# Patient Record
Sex: FEMALE | Race: ASIAN | URBAN - METROPOLITAN AREA
[De-identification: names, ages, dates, MRNs, and addresses within clinical notes are randomized per-mention and may not be internally consistent; named-entity substitution may affect disease eponyms.]

---

## 2017-08-27 ENCOUNTER — OFFICE VISIT (OUTPATIENT)
Dept: FAMILY MEDICINE CLINIC | Facility: CLINIC | Age: 22
End: 2017-08-27

## 2017-08-27 VITALS
HEART RATE: 84 BPM | BODY MASS INDEX: 20.99 KG/M2 | WEIGHT: 100 LBS | DIASTOLIC BLOOD PRESSURE: 64 MMHG | SYSTOLIC BLOOD PRESSURE: 102 MMHG | TEMPERATURE: 98 F | OXYGEN SATURATION: 99 % | HEIGHT: 58 IN

## 2017-08-27 DIAGNOSIS — J06.9 UPPER RESPIRATORY TRACT INFECTION, UNSPECIFIED TYPE: Primary | ICD-10-CM

## 2017-08-27 DIAGNOSIS — R05.9 COUGH: ICD-10-CM

## 2017-08-27 PROCEDURE — 99202 OFFICE O/P NEW SF 15 MIN: CPT | Performed by: PHYSICIAN ASSISTANT

## 2017-08-27 RX ORDER — CETIRIZINE HYDROCHLORIDE 10 MG/1
10 TABLET ORAL DAILY
COMMUNITY

## 2017-08-27 RX ORDER — BENZONATATE 100 MG/1
100 CAPSULE ORAL 3 TIMES DAILY PRN
Qty: 30 CAPSULE | Refills: 0 | Status: SHIPPED | OUTPATIENT
Start: 2017-08-27 | End: 2017-09-06

## 2017-08-27 NOTE — PROGRESS NOTES
CHIEF COMPLAINT:   Patient presents with:  Cough: cough is dry, runny nsoe , nose congestion, drainage x 1 wk      HPI:   Naomi Adams is a 25year old female who presents for upper respiratory symptoms for  7 days.  Patient reports congestion, dry NECK: Supple, non-tender  LUNGS: clear to auscultation bilaterally, no wheezes or rhonchi. Breathing is non labored. CARDIO: RRR without murmur  EXTREMITIES: no cyanosis, clubbing or edema  LYMPH:  no lymphadenopathy.         ASSESSMENT AND PLAN:   Alfredito Gaffney · Avoid being exposed to cigarette smoke (yours or others’). · You may use acetaminophen or ibuprofen to control pain and fever, unless another medicine was prescribed.  (Note: If you have chronic liver or kidney disease, have ever had a stomach ulcer or g The patient is asked to return if sx's persist or worsen.

## 2018-04-07 ENCOUNTER — OFFICE VISIT (OUTPATIENT)
Dept: FAMILY MEDICINE CLINIC | Facility: CLINIC | Age: 23
End: 2018-04-07

## 2018-04-07 VITALS
SYSTOLIC BLOOD PRESSURE: 82 MMHG | HEIGHT: 57 IN | OXYGEN SATURATION: 98 % | DIASTOLIC BLOOD PRESSURE: 58 MMHG | RESPIRATION RATE: 16 BRPM | WEIGHT: 100 LBS | HEART RATE: 112 BPM | TEMPERATURE: 99 F | BODY MASS INDEX: 21.57 KG/M2

## 2018-04-07 DIAGNOSIS — J11.1 INFLUENZA-LIKE ILLNESS: Primary | ICD-10-CM

## 2018-04-07 PROCEDURE — 99213 OFFICE O/P EST LOW 20 MIN: CPT | Performed by: FAMILY MEDICINE

## 2018-04-07 RX ORDER — OSELTAMIVIR PHOSPHATE 75 MG/1
75 CAPSULE ORAL 2 TIMES DAILY
Qty: 10 CAPSULE | Refills: 0 | Status: SHIPPED | OUTPATIENT
Start: 2018-04-07 | End: 2018-04-12

## 2018-04-07 NOTE — PROGRESS NOTES
Patient presents with:  Cough: cough, sneezing , body aches, fever and chills x 1 day       SUBJECTIVE:   Danita Monteiro is a 23year old female who presents complaining of flu-like symptoms of fever to 101, malaise, fatigue, chills, myalgias, conge adenopathy of tonsillar and AC/PC chains  Chest:  CTA, no wheezes, rhonchi or rales. Heart: RRR, no murmur. ASSESSMENT/PLAN:     Influenza-like illness (MARIE)  1. Symptomatic/supportive therapy as detailed in AVS  2.   Antiviral therapy: tamiflu 75 bid

## 2018-04-07 NOTE — PATIENT INSTRUCTIONS
Take tamiflu twice daily for 5 days with food. Use otc meds for comfort:  Ibuprofen for pain and fever. otc cough remedies like delsym will reduce coughing without adding mucus.    Consider applying tommie's vapo-rub or eucalpytus oil to chest and feet at

## 2019-03-09 PROCEDURE — 86900 BLOOD TYPING SEROLOGIC ABO: CPT | Performed by: INTERNAL MEDICINE

## 2019-03-09 PROCEDURE — 86901 BLOOD TYPING SEROLOGIC RH(D): CPT | Performed by: INTERNAL MEDICINE
